# Patient Record
Sex: MALE | Race: WHITE | URBAN - METROPOLITAN AREA
[De-identification: names, ages, dates, MRNs, and addresses within clinical notes are randomized per-mention and may not be internally consistent; named-entity substitution may affect disease eponyms.]

---

## 2024-11-04 ENCOUNTER — TELEPHONE (OUTPATIENT)
Dept: ORTHOPEDICS | Facility: CLINIC | Age: 44
End: 2024-11-04
Payer: COMMERCIAL

## 2024-11-04 DIAGNOSIS — M79.672 LEFT FOOT PAIN: Primary | ICD-10-CM

## 2024-11-05 ENCOUNTER — HOSPITAL ENCOUNTER (OUTPATIENT)
Dept: RADIOLOGY | Facility: HOSPITAL | Age: 44
Discharge: HOME | End: 2024-11-05
Attending: SURGERY
Payer: COMMERCIAL

## 2024-11-05 ENCOUNTER — OFFICE VISIT (OUTPATIENT)
Dept: ORTHOPEDICS | Facility: CLINIC | Age: 44
End: 2024-11-05
Payer: COMMERCIAL

## 2024-11-05 DIAGNOSIS — M79.672 LEFT FOOT PAIN: ICD-10-CM

## 2024-11-05 DIAGNOSIS — M79.672 LEFT FOOT PAIN: Primary | ICD-10-CM

## 2024-11-05 DIAGNOSIS — M19.072 ARTHRITIS OF LEFT SUBTALAR JOINT: ICD-10-CM

## 2024-11-05 PROCEDURE — 73610 X-RAY EXAM OF ANKLE: CPT | Mod: TC,FY,LT

## 2024-11-05 PROCEDURE — 99204 OFFICE O/P NEW MOD 45 MIN: CPT | Mod: S$GLB,,, | Performed by: SURGERY

## 2024-11-05 PROCEDURE — 73630 X-RAY EXAM OF FOOT: CPT | Mod: 26,LT,, | Performed by: RADIOLOGY

## 2024-11-05 PROCEDURE — 73630 X-RAY EXAM OF FOOT: CPT | Mod: TC,FY,LT

## 2024-11-05 PROCEDURE — 99999 PR PBB SHADOW E&M-EST. PATIENT-LVL III: CPT | Mod: PBBFAC,,, | Performed by: SURGERY

## 2024-11-05 PROCEDURE — 73610 X-RAY EXAM OF ANKLE: CPT | Mod: 26,LT,, | Performed by: RADIOLOGY

## 2024-11-05 NOTE — PROGRESS NOTES
History of Present Illness    CHIEF COMPLAINT:  - Homer presents for evaluation of chronic foot pain, primarily in the plantar fascia and posterior heel areas.    HPI:  Homer presents with chronic pain in the plantar fascia and posterior heel area, which has been ongoing for years. The pain sometimes flares up in the plantar fascia region and then moves to the back of the heel. He describes difficulty walking, especially in the morning, stating that occasionally he can barely walk and sometimes it feels like he cannot even take a full step.    Homer previously consulted a podiatrist years ago who recommended surgery, which he declined. He has attempted conservative management including using a boot, wearing better shoes, and doing some stretching, though not consistently. Homer notes that the pain in the Achilles area is a more recent development.    He denies any current pain in the back of the heel.    PREVIOUS TREATMENTS:  - Boot: Tried previously  - Better shoes: Tried previously  - Stretching: Tried previously but not consistently    IMAGING:  - X-rays: Foot: Collapse of the foot, arthritis in multiple areas including the talus and another joint      ROS:  Musculoskeletal: +joint pain, -muscle pain             DIAGNOSTIC STUDIES:  X-rays of the left foot reveal a loss of the normal architecture, most prominent on the lateral image, increased talonavicular under coverage, and arthritis of the hindfoot.    ASSESSMENT:   1. Rigid progressive collapsing foot as above      PLAN:    LIFESTYLE:  - Recommend weight loss to reduce pressure on the foot.  - Advised using a lace-up ankle brace with a normal shoe.    REFERRALS:  - Referred to physical therapy to help strengthen the muscles and improve stretching in the foot area.    PROCEDURES:  - Discussed potential future triple arthrodesis surgery, a fusion of 3 joints in the back of the foot, if conservative treatments fail. Explained that this surgery takes away motion  but helps somewhat with foot alignment. Informed patient that optimization for surgery in terms of weight and blood sugar would be necessary if surgery becomes required.          Samuel Menendez MD  [facility]  Orthopedic Surgery      This note was done with voice recognition software. Please excuse any errors missed in proof reading.   This note was generated with the assistance of ambient listening technology. Verbal consent was obtained by the patient and accompanying visitor(s) for the recording of patient appointment to facilitate this note. I attest to having reviewed and edited the generated note for accuracy, though some syntax or spelling errors may persist. Please contact the author of this note for any clarification.

## 2025-01-30 DIAGNOSIS — M19.072 ARTHRITIS OF LEFT SUBTALAR JOINT: Primary | ICD-10-CM
